# Patient Record
Sex: MALE
[De-identification: names, ages, dates, MRNs, and addresses within clinical notes are randomized per-mention and may not be internally consistent; named-entity substitution may affect disease eponyms.]

---

## 2024-11-18 ENCOUNTER — CARE COORDINATION (OUTPATIENT)
Dept: OTHER | Facility: CLINIC | Age: 69
End: 2024-11-18

## 2024-11-18 NOTE — CARE COORDINATION
Care Transitions Note    Initial Call - Call within 2 business days of discharge: Yes    Patient Current Location:  Home: [address] [place] [id number]    Care Transition Nurse contacted the patient, spouse/partner  by telephone to perform post hospital discharge assessment, verified name and  as identifiers. Provided introduction to self, and explanation of the Care Transition Nurse role.     Patient: Saul Payton    Patient : 1955   MRN: [id number]    Reason for Admission: pneumonia, acute kidney failure, hyperglycemia    Discharge Date:    RURS: No data recorded    Was this an external facility discharge? Yes. Discharge Date: 24. Facility Name: [facility]      Care Summary Note: CTN attempted to outreach patient for initial [facility] call post discharge for pneumonia, acute kidney failure and hyperglycemia. Patient's wife answered the phone and stated patient wasn't available at this time. CTN gave brief HIPAA compliant, explanation of role with PF and requested for a better time to call to speak with patient or for patient to return CTN's call. Patient's wife stated he has no care management needs at this time and declines further [facility] calls. CTN will sign off.       Patient closed (declined further ROSANA services) from the Care Transitions program on 24.    Handoff:   Patient was not referred to the ACM team due to patient declined services.      Patient has agreed to contact primary care provider and/or specialist for any further questions, concerns, or needs.    Kyra Calvert RN